# Patient Record
Sex: FEMALE | Race: OTHER | URBAN - METROPOLITAN AREA
[De-identification: names, ages, dates, MRNs, and addresses within clinical notes are randomized per-mention and may not be internally consistent; named-entity substitution may affect disease eponyms.]

---

## 2023-07-21 ENCOUNTER — TELEPHONE (OUTPATIENT)
Dept: OBSTETRICS AND GYNECOLOGY | Facility: CLINIC | Age: 73
End: 2023-07-21
Payer: MEDICARE

## 2023-07-21 NOTE — TELEPHONE ENCOUNTER
----- Message from Margarito Shaffer sent at 7/21/2023  3:44 PM CDT -----  Regarding: rt call  Type:  Patient Returning Call    Who Called:  pt  Who Left Message for Patient:  Kindraemmanuel Blanco  Does the patient know what this is regarding?:  yes  Best Call Back Number:  [phone]    Additional Information:

## 2023-07-21 NOTE — TELEPHONE ENCOUNTER
----- Message from Gabi Palomino sent at 7/21/2023  7:55 AM CDT -----  Regarding: Needs to schedule  Type: Needs Medical Advice  Who Called:  Mary Vargas Call Back Number: [phone]    Additional Information: Pt called she was trying to see what the soonest she could see zee martinez, it would not let me view what her next available was needs well womans visit         No. SWETHA screening performed.  STOP BANG Legend: 0-2 = LOW Risk; 3-4 = INTERMEDIATE Risk; 5-8 = HIGH Risk